# Patient Record
Sex: MALE | Race: WHITE
[De-identification: names, ages, dates, MRNs, and addresses within clinical notes are randomized per-mention and may not be internally consistent; named-entity substitution may affect disease eponyms.]

---

## 2019-06-01 ENCOUNTER — HOSPITAL ENCOUNTER (INPATIENT)
Dept: HOSPITAL 96 - M.ERS | Age: 54
LOS: 3 days | Discharge: HOME | DRG: 309 | End: 2019-06-04
Attending: FAMILY MEDICINE | Admitting: FAMILY MEDICINE
Payer: COMMERCIAL

## 2019-06-01 VITALS — DIASTOLIC BLOOD PRESSURE: 78 MMHG | SYSTOLIC BLOOD PRESSURE: 118 MMHG

## 2019-06-01 VITALS — HEIGHT: 77.01 IN | WEIGHT: 238 LBS | BODY MASS INDEX: 28.1 KG/M2

## 2019-06-01 VITALS — DIASTOLIC BLOOD PRESSURE: 69 MMHG | SYSTOLIC BLOOD PRESSURE: 130 MMHG

## 2019-06-01 VITALS — DIASTOLIC BLOOD PRESSURE: 78 MMHG | SYSTOLIC BLOOD PRESSURE: 121 MMHG

## 2019-06-01 DIAGNOSIS — E83.52: ICD-10-CM

## 2019-06-01 DIAGNOSIS — Z82.5: ICD-10-CM

## 2019-06-01 DIAGNOSIS — F19.10: ICD-10-CM

## 2019-06-01 DIAGNOSIS — E83.39: ICD-10-CM

## 2019-06-01 DIAGNOSIS — F15.10: ICD-10-CM

## 2019-06-01 DIAGNOSIS — F10.10: ICD-10-CM

## 2019-06-01 DIAGNOSIS — I50.22: ICD-10-CM

## 2019-06-01 DIAGNOSIS — I48.91: Primary | ICD-10-CM

## 2019-06-01 DIAGNOSIS — Z96.651: ICD-10-CM

## 2019-06-01 DIAGNOSIS — I25.2: ICD-10-CM

## 2019-06-01 DIAGNOSIS — B19.20: ICD-10-CM

## 2019-06-01 DIAGNOSIS — E87.6: ICD-10-CM

## 2019-06-01 DIAGNOSIS — F17.210: ICD-10-CM

## 2019-06-01 DIAGNOSIS — Z95.5: ICD-10-CM

## 2019-06-01 DIAGNOSIS — F41.9: ICD-10-CM

## 2019-06-01 DIAGNOSIS — N17.9: ICD-10-CM

## 2019-06-01 DIAGNOSIS — I25.118: ICD-10-CM

## 2019-06-01 LAB
ABSOLUTE BASOPHILS: 0.1 THOU/UL (ref 0–0.2)
ABSOLUTE EOSINOPHILS: 0.1 THOU/UL (ref 0–0.7)
ABSOLUTE MONOCYTES: 1.3 THOU/UL (ref 0–1.2)
ALBUMIN SERPL-MCNC: 4.7 G/DL (ref 3.4–5)
ALP SERPL-CCNC: 100 U/L (ref 46–116)
ALT SERPL-CCNC: 144 U/L (ref 30–65)
AMP/METHAMP: POSITIVE
ANION GAP SERPL CALC-SCNC: 21 MMOL/L (ref 7–16)
APTT BLD: 29.2 SECONDS (ref 25–31.3)
AST SERPL-CCNC: 126 U/L (ref 15–37)
BASOPHILS NFR BLD AUTO: 0.7 %
BILIRUB SERPL-MCNC: 2 MG/DL
BILIRUB UR-MCNC: (no result) MG/DL
BUN SERPL-MCNC: 38 MG/DL (ref 7–18)
CALCIUM SERPL-MCNC: 10.8 MG/DL (ref 8.5–10.1)
CALCIUM SERPL-MCNC: 11.1 MG/DL (ref 8.5–10.1)
CHLORIDE SERPL-SCNC: 95 MMOL/L (ref 98–107)
CO2 SERPL-SCNC: 19 MMOL/L (ref 21–32)
COARSE GRAN CASTS #/AREA URNS LPF: (no result) /LPF
COLOR UR: YELLOW
CREAT SERPL-MCNC: 1.7 MG/DL (ref 0.6–1.3)
CREAT SERPL-MCNC: 2.2 MG/DL (ref 0.6–1.3)
EOSINOPHIL NFR BLD: 0.5 %
FINE GRAN CASTS #/AREA URNS LPF: (no result) /LPF
GLUCOSE SERPL-MCNC: 203 MG/DL (ref 70–99)
GRANULOCYTES NFR BLD MANUAL: 65 %
HCT VFR BLD CALC: 53.1 % (ref 42–52)
HGB BLD-MCNC: 18.4 GM/DL (ref 14–18)
HYALINE CASTS #/AREA URNS LPF: (no result) /LPF
INR PPP: 1
KETONES UR STRIP-MCNC: (no result) MG/DL
LIPASE: 161 U/L (ref 73–393)
LYMPHOCYTES # BLD: 2.7 THOU/UL (ref 0.8–5.3)
LYMPHOCYTES NFR BLD AUTO: 22.8 %
MCH RBC QN AUTO: 31.5 PG (ref 26–34)
MCHC RBC AUTO-ENTMCNC: 34.6 G/DL (ref 28–37)
MCV RBC: 90.9 FL (ref 80–100)
MONOCYTES NFR BLD: 11 %
MPV: 8.9 FL. (ref 7.2–11.1)
NEUTROPHILS # BLD: 7.7 THOU/UL (ref 1.6–8.1)
NITRITE UR QL STRIP: NEGATIVE
NT-PRO BRAIN NAT PEPTIDE: 664 PG/ML (ref ?–300)
NUCLEATED RBCS: 0 /100WBC
PHOSPHATE SERPL-MCNC: 4.6 MG/DL (ref 2.5–4.9)
PLATELET COUNT*: 208 THOU/UL (ref 150–400)
POTASSIUM SERPL-SCNC: 3.2 MMOL/L (ref 3.5–5.1)
PROT SERPL-MCNC: 9.5 G/DL (ref 6.4–8.2)
PROT UR QL STRIP: (no result)
PROTHROMBIN TIME: 10.5 SECONDS (ref 9.2–11.5)
RBC # BLD AUTO: 5.85 MIL/UL (ref 4.5–6)
RBC # UR STRIP: (no result) /UL
RBC #/AREA URNS HPF: (no result) /HPF (ref 0–2)
RDW-CV: 15.3 % (ref 10.5–14.5)
SODIUM SERPL-SCNC: 135 MMOL/L (ref 136–145)
SP GR UR STRIP: >= 1.03 (ref 1–1.03)
SQUAMOUS: (no result) /LPF (ref 0–3)
THC: POSITIVE
TROPONIN-I LEVEL: <0.06 NG/ML (ref ?–0.06)
URINE CLARITY: (no result)
URINE GLUCOSE-RANDOM: NEGATIVE
URINE LEUKOCYTES: NEGATIVE
UROBILINOGEN UR STRIP-ACNC: 1 E.U./DL (ref 0.2–1)
WBC # BLD AUTO: 11.9 THOU/UL (ref 4–11)

## 2019-06-01 NOTE — EEG
[address]  [address]                    EEG STUDY REPORT              
_______________________________________________________________________________
 
Name:       VENTURA PIERRE                  Room:           [address] [place]  
M.R.#:  [id number]      Account #:      [id number]  
Admission:  06/01/19     Attend Phys:    Salvador Brewer, 
Discharge:               Date of Birth:  [birth year]  
         Report #: [id number]
                                                                     [id number]  
_______________________________________________________________________________
THIS REPORT FOR:  //name//                      
 
CC: KERRI physician/PCP
    Salvador Brewer
 
HISTORY OF PRESENT ILLNESS:  The patient is a 53-year-old male who was 2 stories
up working on a roof when he began to feel very dizzy.  He came down to the
ground, but when he did, he was told that he began to have a seizure.  He had
jerking of his arms and legs and in fact scratched the top of his head on the
fence, he said that the coworkers were afraid when this happened.  The patient
did not bite his tongue, but afterwards he was very confused.
 
The patient states that this is his second seizure.  He had one a few years
back.  This seizure was also secondary to drug use.  The patient smokes
marijuana and takes methamphetamines.  The patient also told me that he does not
drive.  He has a car, but he has no license to drive a motor vehicle.
 
PAST MEDICAL HISTORY:  Coronary artery disease, anxiety, illicit drug use.
 
PAST SURGICAL HISTORY:  Cardiac stent, right knee replacement.
 
MEDICATIONS:  None.
 
ALLERGIES:  None.
 
PHYSICAL EXAMINATION:
VITAL SIGNS:  Temperature is 36.5, pulse rate 85, respiratory rate 18, blood
pressure 111/74, bedside pulse oximetry 98% on 2 liters.
 
LABORATORY DATA:  Hematology:  White blood cell count 11.9, hemoglobin 18.4,
hematocrit 53.1, MCV 90.9, platelet count 208,000.  INR 1.  Urinalysis, 1+
protein, trace ketones, 1+ blood, 2+ bilirubin.  Ictotest negative.  
 
Chemistry:  Sodium 135, potassium 3.2, chloride 95, carbon dioxide 19, BUN 38,
creatinine 1.7, GFR 31, glucose 203, calcium 10.8, phosphorus 4.6, total
bilirubin 2, , , alkaline phosphatase 100.  TSH 1.849. 
Toxicology:  Urine drug screen positive for amphetamine/methamphetamine and
marijuana.
 
NEUROLOGIC:  Cranial nerves 2-12 are grossly intact.  Motor exam demonstrates
symmetrical strength in all 4 extremities with tone and bulk normal.  Reflexes
are trace throughout.  Plantar responses are flexor.  Coordination reveals
intact finger-to-nose.  Gait was not tested.
 
IMPRESSION:  This patient has had a seizure.  This is most likely secondary to
drug use; however, as this is his second seizure, I would recommend an
 
 
 
[place] [address]                    EEG STUDY REPORT              
_______________________________________________________________________________
 
Name:       VENTURA PIERRE                  Room:           [address] [place]  
.R.#:  [id number]      Account #:      [id number]  
Admission:  06/01/19     Attend Phys:    Salvador Brewer, 
Discharge:               Date of Birth:  [birth year]  
         Report #: [id number]
                                                                     [id number]  
_______________________________________________________________________________
electroencephalogram in the morning and an MRI of the head, unless there is
something abnormal on either of these studies.  I would not recommend medication
at this point.
 
The patient most likely needs help with his illicit drug use.  He understands
that he cannot drive at least in the state of [place] for 6 months.  I do not
know what the rules are in [place].  Apparently, the patient came here to work.
 
Dr. Wilson will be seeing the patient as of Monday morning.
 
 
 
 
 
 
 
 
 
 
 
 
 
 
 
 
 
 
 
 
 
 
 
 
 
 
 
 
 
 
 
 
 
 
 
                       
                                        By:                                
                 
D: 06/02/19 1243_______________________________________
T: 06/03/19 0159Daniela Boyd DO             /nt

## 2019-06-02 VITALS — DIASTOLIC BLOOD PRESSURE: 74 MMHG | SYSTOLIC BLOOD PRESSURE: 111 MMHG

## 2019-06-02 VITALS — DIASTOLIC BLOOD PRESSURE: 74 MMHG | SYSTOLIC BLOOD PRESSURE: 116 MMHG

## 2019-06-02 VITALS — SYSTOLIC BLOOD PRESSURE: 109 MMHG | DIASTOLIC BLOOD PRESSURE: 71 MMHG

## 2019-06-02 VITALS — DIASTOLIC BLOOD PRESSURE: 81 MMHG | SYSTOLIC BLOOD PRESSURE: 116 MMHG

## 2019-06-02 VITALS — SYSTOLIC BLOOD PRESSURE: 110 MMHG | DIASTOLIC BLOOD PRESSURE: 72 MMHG

## 2019-06-02 LAB
AMMONIA PLAS-SCNC: 20 UMOL/L (ref 11–32)
ANION GAP SERPL CALC-SCNC: 10 MMOL/L (ref 7–16)
BUN SERPL-MCNC: 28 MG/DL (ref 7–18)
CALCIUM SERPL-MCNC: 8.5 MG/DL (ref 8.5–10.1)
CALCIUM SERPL-MCNC: 8.5 MG/DL (ref 8.5–10.1)
CHLORIDE SERPL-SCNC: 105 MMOL/L (ref 98–107)
CO2 SERPL-SCNC: 25 MMOL/L (ref 21–32)
CREAT SERPL-MCNC: 0.9 MG/DL (ref 0.6–1.3)
GLUCOSE SERPL-MCNC: 91 MG/DL (ref 70–99)
INR PPP: 1
MAGNESIUM SERPL-MCNC: 1.9 MG/DL (ref 1.8–2.4)
PHOSPHATE SERPL-MCNC: 2.7 MG/DL (ref 2.5–4.9)
POTASSIUM SERPL-SCNC: 3.9 MMOL/L (ref 3.5–5.1)
PROTHROMBIN TIME: 10.4 SECONDS (ref 9.2–11.5)
SODIUM SERPL-SCNC: 140 MMOL/L (ref 136–145)

## 2019-06-03 VITALS — DIASTOLIC BLOOD PRESSURE: 81 MMHG | SYSTOLIC BLOOD PRESSURE: 126 MMHG

## 2019-06-03 VITALS — SYSTOLIC BLOOD PRESSURE: 124 MMHG | DIASTOLIC BLOOD PRESSURE: 76 MMHG

## 2019-06-03 VITALS — DIASTOLIC BLOOD PRESSURE: 87 MMHG | SYSTOLIC BLOOD PRESSURE: 131 MMHG

## 2019-06-03 VITALS — SYSTOLIC BLOOD PRESSURE: 119 MMHG | DIASTOLIC BLOOD PRESSURE: 77 MMHG

## 2019-06-03 VITALS — DIASTOLIC BLOOD PRESSURE: 78 MMHG | SYSTOLIC BLOOD PRESSURE: 116 MMHG

## 2019-06-03 VITALS — SYSTOLIC BLOOD PRESSURE: 119 MMHG | DIASTOLIC BLOOD PRESSURE: 72 MMHG

## 2019-06-03 VITALS — SYSTOLIC BLOOD PRESSURE: 113 MMHG | DIASTOLIC BLOOD PRESSURE: 73 MMHG

## 2019-06-03 LAB
ALBUMIN SERPL-MCNC: 3 G/DL (ref 3.4–5)
ALP SERPL-CCNC: 66 U/L (ref 46–116)
ALT SERPL-CCNC: 91 U/L (ref 30–65)
ANION GAP SERPL CALC-SCNC: 10 MMOL/L (ref 7–16)
AST SERPL-CCNC: 80 U/L (ref 15–37)
BILIRUB SERPL-MCNC: 1.2 MG/DL
BUN SERPL-MCNC: 19 MG/DL (ref 7–18)
CALCIUM SERPL-MCNC: 7.9 MG/DL (ref 8.5–10.1)
CHLORIDE SERPL-SCNC: 108 MMOL/L (ref 98–107)
CO2 SERPL-SCNC: 23 MMOL/L (ref 21–32)
CREAT SERPL-MCNC: 0.7 MG/DL (ref 0.6–1.3)
GLUCOSE SERPL-MCNC: 102 MG/DL (ref 70–99)
HCT VFR BLD CALC: 45.6 % (ref 42–52)
HGB BLD-MCNC: 15.3 GM/DL (ref 14–18)
MAGNESIUM SERPL-MCNC: 1.9 MG/DL (ref 1.8–2.4)
MCH RBC QN AUTO: 31.2 PG (ref 26–34)
MCHC RBC AUTO-ENTMCNC: 33.4 G/DL (ref 28–37)
MCV RBC: 93.3 FL (ref 80–100)
MPV: 9.1 FL. (ref 7.2–11.1)
PLATELET COUNT*: 139 THOU/UL (ref 150–400)
POTASSIUM SERPL-SCNC: 3.6 MMOL/L (ref 3.5–5.1)
PROT SERPL-MCNC: 6.6 G/DL (ref 6.4–8.2)
PTH-INTACT SERPL-MCNC: 6 PG/ML (ref 15–65)
RBC # BLD AUTO: 4.89 MIL/UL (ref 4.5–6)
RDW-CV: 15.2 % (ref 10.5–14.5)
SODIUM SERPL-SCNC: 141 MMOL/L (ref 136–145)
WBC # BLD AUTO: 8.1 THOU/UL (ref 4–11)

## 2019-06-03 NOTE — EKG
[place] [address]
Phone:  [phone]                     ELECTROCARDIOGRAM REPORT      
_______________________________________________________________________________
 
Name:       GABBYVENTURA                  Room:           [address]    [place]  
M.R.#:  [id number]      Account #:      [id number]  
Admission:  19     Attend Phys:    Salvador Brewer, 
Discharge:               Date of Birth:  [birth year]  
         Report #: [id number]
    [id number]
_______________________________________________________________________________
THIS REPORT FOR:  //name//                      
 
                         [facility] ED
                                       
Test Date:    2019               Test Time:    21:10:07
Pat Name:     VENTURA PIERRE            Department:   
Patient ID:   [id number]            [facility]
Gender:       M                        Technician:   BLANCA
:          1965               Requested By: Gracia Segovia
Order Number: [id number] MD:   Neal Parsons
                                 Measurements
Intervals                              Axis          
Rate:         90                       P:            50
HI:           152                      QRS:          89
QRSD:         97                       T:            32
QT:           377                                    
QTc:          462                                    
                           Interpretive Statements
Sinus rhythm
High st takeoff anteriorly, consider injury
No previous ECG available for comparison
 
Electronically Signed On 6-3-2019 16:56:42 CDT by Neal Parsons
[email or web address]
 
 
 
 
 
 
 
 
 
 
 
 
 
 
 
 
 
 
 
  <ELECTRONICALLY SIGNED>
                                           By: Neal Parsons MD, Located within [facility]     
  19     1656
D: 19   _____________________________________
T: 19   Neal Parsons MD, FACC       /EPI

## 2019-06-03 NOTE — EKG
[place] [address]
Phone:  [phone]                     ELECTROCARDIOGRAM REPORT      
_______________________________________________________________________________
 
Name:       GABBYVENTURA                  Room:           [address]    [place]  
M.R.#:  [id number]      Account #:      [id number]  
Admission:  19     Attend Phys:    Salvador Brewer, 
Discharge:               Date of Birth:  [birth year]  
         Report #: [id number]
    [id number]
_______________________________________________________________________________
THIS REPORT FOR:  //name//                      
 
                          [facility]
                                       
Test Date:    2019               Test Time:    15:56:42
Pat Name:     VENTURA PIERRE            Department:   
Patient ID:   [id number]            Room:         [address]
Gender:       M                        Technician:   
:          1965               Requested By: Vernell Montero
Order Number: [id number]    Aleyda MD:   Neal Parsons
                                 Measurements
Intervals                              Axis          
Rate:         70                       P:            42
GA:           144                      QRS:          80
QRSD:         102                      T:            59
QT:           414                                    
QTc:          447                                    
                           Interpretive Statements
Sinus rhythm
ST elev, probable normal early repol pattern
No previous ECG available for comparison
 
Electronically Signed On 6-3-2019 17:11:55 CDT by Neal Parsons
[email or web address]
 
 
 
 
 
 
 
 
 
 
 
 
 
 
 
 
 
 
 
  <ELECTRONICALLY SIGNED>
                                           By: Neal Parsons MD, [facility]     
  19     1711
D: 19 1556   _____________________________________
T: 19 1556   Neal Parsons MD, FACC       /EPI

## 2019-06-03 NOTE — 2DMMODE
[place] [address]
Phone:  [phone] 2 D/M-MODE ECHOCARDIOGRAM     
_______________________________________________________________________________
 
Name:         VENTURA PIERRE                 Room:          [address] [place] 
.R.#:    [id number]     Account #:     [id number]  
Admission:    19    Attend Phys:   Salvador Abdalla
Discharge:                Date of Birth: [birth year]  
Date of Service: 19 1406  Report #:      [id number]
        [id number]
_______________________________________________________________________________
THIS REPORT FOR:  //name//                      
 
 
--------------- APPROVED REPORT --------------
 
 
Study performed:  2019 10:51:47
 
EXAM: Comprehensive 2D, Doppler, and color-flow 
Echocardiogram 
Patient Location: Bedside   
 
      BSA:         2.34
HR: 85 bpm BP:          126/81 mmHg 
 
Other Information 
Study Quality: Fair
 
Indications
Chest Pain
 
2D Dimensions
IVSd:  9.84 (7-11mm) LVOT Diam:  23.72 (18-24mm) 
LVDd:  60.85 mm  
PWd:  9.04 (7-11mm) Ascending Ao:  35.23 (22-36mm)
LVDs:  42.28 (25-40mm) 
Aortic Root:  31.45 mm 
 
Volumes
Left Atrial Volume (Systole) 
    LA ESV Index:  24.90 mL/m2
 
Aortic Valve
AoV Peak Gigi.:  1.23 m/s 
AO Peak Gr.:  6.04 mmHg  LVOT Max PG:  3.47 mmHg
AO Mean Gr.:  3.68 mmHg  LVOT Mean P.82 mmHg
    LVOT Max V:  0.93 m/s
AO V2 VTI:  20.55 cm  LVOT Mean V:  0.63 m/s
MIRIAN (VTI):  3.82 cm2  LVOT V1 VTI:  17.78 cm
 
Mitral Valve
    E/A Ratio:  1.29
    MV Decel. Time:  186.47 ms
MV E Max Gigi.:  0.60 m/s 
MV PHT:  54.08 ms  
MVA (PHT):  4.07 cm2  
 
 
[place] [address]
Phone:  [phone]                     2 D/M-MODE ECHOCARDIOGRAM     
_______________________________________________________________________________
 
Name:         GABBYVENTURA                 Room:          [address] [place] 
.R.#:    [id number]     Account #:     [id number]  
Admission:    19    Attend Phys:   Salvador Abdalla
Discharge:                Date of Birth: [birth year]  
Date of Service: 19 1406  Report #:      [id number]
        [id number]
_______________________________________________________________________________
 
TDI
E/Lateral E':  6.67 E/Medial E':  5.45
   Medial E' Gigi.:  0.11 m/s
   Lateral E' Gigi.:  0.09 m/s
 
Pulmonary Valve
PV Peak Gigi.:  0.81 m/s PV Peak Gr.:  2.64 mmHg
 
Tricuspid Valve
    RAP Estimate:  5.00 mmHg
TR Peak Gr.:  17.68 mmHg RVSP:  22.68 mmHg
    PA Pressure:  22.68 mmHg
 
Left Ventricle
The left ventricle is normal size. There is mild diffuse hypokinesis 
of left ventricular wall motion. There is normal left ventricular 
wall thickness. Left ventricular systolic function is mildly 
decreased. LVEF is 45%.
 
Right Ventricle
The right ventricle is normal size. The right ventricular systolic 
function is normal.
 
Atria
The left atrium size is normal. The atrial septum is aneurysmal. The 
right atrium size is normal.
 
Aortic Valve
The aortic valve is normal in structure. No aortic regurgitation is 
present. There is no aortic valvular stenosis.
 
Mitral Valve
The mitral valve is normal in structure. Trace mitral regurgitation. 
No evidence of mitral valve stenosis.
 
Tricuspid Valve
The tricuspid valve is normal in structure. Trace tricuspid 
regurgitation.
 
Pulmonic Valve
The pulmonary valve is normal in structure. There is no pulmonic 
valvular regurgitation.
 
Great Vessels
The aortic root is normal in size. IVC is normal in size and 
 
 
[place] [address]
Phone:  [phone]                     2 D/M-MODE ECHOCARDIOGRAM     
_______________________________________________________________________________
 
Name:         VENTURA PIERRE                 Room:          [address] [place] 
[facility]#:    [id number]     Account #:     [id number]  
Admission:    19    Attend Phys:   Salvador Abdalla
Discharge:                Date of Birth: [birth year]  
Date of Service: 19 1406  Report #:      [id number]
        [id number]
_______________________________________________________________________________
collapses >50% with inspiration.
 
Pericardium
There is no pericardial effusion.
 
<Conclusion>
The left ventricle is normal size.
There is normal left ventricular wall thickness.
Left ventricular systolic function is mildly decreased.
LVEF is 45%.
The right ventricle is normal size.
The left atrium size is normal.
The aortic valve is normal in structure.
The mitral valve is normal in structure.
Trace mitral regurgitation.
The tricuspid valve is normal in structure.
IVC is normal in size and collapses >50% with inspiration.
There is no pericardial effusion.
The atrial septum is aneurysmal.
There is mild diffuse hypokinesis of left ventricular wall motion.
 
 
 
 
 
 
 
 
 
 
 
 
 
 
 
 
 
 
 
 
 
 
 
 
  <ELECTRONICALLY SIGNED>
                                           By: Neal Parsons MD, [facility]     
  19     1406
D: 19 1406   _____________________________________
T: 19 1406   Neal Parsons MD, [facility]       /INF

## 2019-06-03 NOTE — EKG
[place] [address]
Phone:  [phone]                     ELECTROCARDIOGRAM REPORT      
_______________________________________________________________________________
 
Name:       VENTURA PIERRE                  Room:           [address]    [place]  
M.R.#:  [id number]      Account #:      [id number]  
Admission:  19     Attend Phys:    Salvador Brewer, 
Discharge:               Date of Birth:  [birth year]  
         Report #: [id number]
    [id number]
_______________________________________________________________________________
THIS REPORT FOR:  //name//                      
 
                         [facility] ED
                                       
Test Date:    2019               Test Time:    19:05:39
Pat Name:     VENTURA PIERRE            Department:   
Patient ID:   [id number]            Room:         [facility]
Gender:       M                        Technician:   MR
:          1965               Requested By: Gracia Segovia
Order Number: [id number] MD:   Neal Parsons
                                 Measurements
Intervals                              Axis          
Rate:         182                      P:            
PA:                                    QRS:          97
QRSD:         94                       T:            -43
QT:           280                                    
QTc:          487                                    
                           Interpretive Statements
Atrial fibrillation with rapid V-rate
Borderline right axis deviation
Consider left ventricular hypertrophy
Repolarization abnormality, prob rate related
Baseline wander in lead(s) V2,V4,V5,V6
No previous ECG available for comparison
 
Electronically Signed On 6-3-2019 16:55:22 CDT by Neal Parsons
[email or web address]
 
 
 
 
 
 
 
 
 
 
 
 
 
 
 
 
  <ELECTRONICALLY SIGNED>
                                           By: Neal Parsons MD, [facility]     
  19     1655
D: 19 1905   _____________________________________
T: 19 190   Neal Parsons MD, [facility]       /EPI

## 2019-06-04 VITALS — DIASTOLIC BLOOD PRESSURE: 74 MMHG | SYSTOLIC BLOOD PRESSURE: 121 MMHG

## 2019-06-04 VITALS — DIASTOLIC BLOOD PRESSURE: 87 MMHG | SYSTOLIC BLOOD PRESSURE: 137 MMHG

## 2019-06-04 VITALS — SYSTOLIC BLOOD PRESSURE: 116 MMHG | DIASTOLIC BLOOD PRESSURE: 75 MMHG

## 2019-06-04 VITALS — SYSTOLIC BLOOD PRESSURE: 115 MMHG | DIASTOLIC BLOOD PRESSURE: 81 MMHG

## 2019-06-04 LAB
ALBUMIN SERPL-MCNC: 3.1 G/DL (ref 3.4–5)
ALP SERPL-CCNC: 68 U/L (ref 46–116)
ALT SERPL-CCNC: 79 U/L (ref 30–65)
ANION GAP SERPL CALC-SCNC: 11 MMOL/L (ref 7–16)
AST SERPL-CCNC: 56 U/L (ref 15–37)
BILIRUB SERPL-MCNC: 1 MG/DL
BUN SERPL-MCNC: 11 MG/DL (ref 7–18)
CALCIUM SERPL-MCNC: 8.3 MG/DL (ref 8.5–10.1)
CHLORIDE SERPL-SCNC: 107 MMOL/L (ref 98–107)
CO2 SERPL-SCNC: 23 MMOL/L (ref 21–32)
CREAT SERPL-MCNC: 0.7 MG/DL (ref 0.6–1.3)
GLUCOSE SERPL-MCNC: 93 MG/DL (ref 70–99)
HCT VFR BLD CALC: 45.3 % (ref 42–52)
HGB BLD-MCNC: 15.5 GM/DL (ref 14–18)
MAGNESIUM SERPL-MCNC: 2 MG/DL (ref 1.8–2.4)
MCH RBC QN AUTO: 32 PG (ref 26–34)
MCHC RBC AUTO-ENTMCNC: 34.2 G/DL (ref 28–37)
MCV RBC: 93.6 FL (ref 80–100)
MPV: 9.5 FL. (ref 7.2–11.1)
PHOSPHATE SERPL-MCNC: 1.8 MG/DL (ref 2.5–4.9)
PLATELET COUNT*: 153 THOU/UL (ref 150–400)
POTASSIUM SERPL-SCNC: 3.7 MMOL/L (ref 3.5–5.1)
PROT SERPL-MCNC: 6.8 G/DL (ref 6.4–8.2)
RBC # BLD AUTO: 4.84 MIL/UL (ref 4.5–6)
RDW-CV: 15.1 % (ref 10.5–14.5)
SODIUM SERPL-SCNC: 141 MMOL/L (ref 136–145)
WBC # BLD AUTO: 6.4 THOU/UL (ref 4–11)

## 2019-06-05 NOTE — EEG
[address]  [address]                    EEG STUDY REPORT              
_______________________________________________________________________________
 
Name:       VENTURA PIERRE                  Room:           [address] [place]  
M.R.#:  [id number]      Account #:      [id number]  
Admission:  06/01/19     Attend Phys:    Salvador Brewer, 
Discharge:  06/04/19     Date of Birth:  [birth year]  
         Report #: [id number]
                                                                     [id number]  
_______________________________________________________________________________
THIS REPORT FOR:  //name//                      
 
CC: [facility] physician/PCP
    Salvador Brewer
 
DATE OF SERVICE:  06/03/2019
 
 
This patient is being evaluated for the possibility of seizure.  EEG was done by
placing the electrodes by standard 10-20 system of electrode placement.  Both
referential and sequential montages were used for recording.  Moderate amount of
artifact is present throughout the record.  The patient went to sleep and that
is associated with bilateral slowing and vertex sharp waves.  No active
epileptiform activity was noticed.
 
IMPRESSION:  This patient's EEG does not demonstrate any clear-cut epileptiform
activity and was unremarkable.
 
Thank you very much for this referral.
 
 
 
 
 
 
 
 
 
 
 
 
 
 
 
 
 
 
 
 
 
 
 
 
 
<ELECTRONICALLY SIGNED>
                                        By:  Brandon Wilson MD         
06/05/19     1434
D: 06/04/19 0827_______________________________________
T: 06/04/19 0833Brandon Wilson MD            /nt